# Patient Record
Sex: MALE | Race: WHITE
[De-identification: names, ages, dates, MRNs, and addresses within clinical notes are randomized per-mention and may not be internally consistent; named-entity substitution may affect disease eponyms.]

---

## 2017-04-14 ENCOUNTER — HOSPITAL ENCOUNTER (OUTPATIENT)
Dept: HOSPITAL 8 - CFH | Age: 57
Discharge: HOME | End: 2017-04-14
Attending: NURSE PRACTITIONER
Payer: MEDICARE

## 2017-04-14 DIAGNOSIS — S33.120A: Primary | ICD-10-CM
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
## 2017-04-14 DIAGNOSIS — S34.101A: ICD-10-CM

## 2017-04-14 DIAGNOSIS — M46.86: ICD-10-CM
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
## 2017-04-14 DIAGNOSIS — M48.06: ICD-10-CM

## 2017-04-14 DIAGNOSIS — M47.816: ICD-10-CM
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
## 2017-04-14 DIAGNOSIS — M48.07: ICD-10-CM

## 2017-04-14 DIAGNOSIS — M48.56XA: ICD-10-CM

## 2017-04-14 PROCEDURE — 72110 X-RAY EXAM L-2 SPINE 4/>VWS: CPT

## 2017-04-14 PROCEDURE — 72148 MRI LUMBAR SPINE W/O DYE: CPT

## 2017-07-11 ENCOUNTER — HOSPITAL ENCOUNTER (OUTPATIENT)
Dept: HOSPITAL 8 - CFH | Age: 57
Discharge: HOME | End: 2017-07-11
Attending: NURSE PRACTITIONER
Payer: MEDICARE

## 2017-07-11 DIAGNOSIS — M50.21: ICD-10-CM

## 2017-07-11 DIAGNOSIS — M47.812: ICD-10-CM

## 2017-07-11 DIAGNOSIS — M50.323: Primary | ICD-10-CM

## 2017-07-11 PROCEDURE — 72050 X-RAY EXAM NECK SPINE 4/5VWS: CPT

## 2017-07-11 PROCEDURE — 72141 MRI NECK SPINE W/O DYE: CPT

## 2018-11-15 ENCOUNTER — HOSPITAL ENCOUNTER (OUTPATIENT)
Dept: HOSPITAL 8 - CVU | Age: 58
Discharge: HOME | End: 2018-11-15
Attending: INTERNAL MEDICINE
Payer: MEDICARE

## 2018-11-15 DIAGNOSIS — G45.9: ICD-10-CM

## 2018-11-15 DIAGNOSIS — Z72.0: ICD-10-CM

## 2018-11-15 DIAGNOSIS — E78.5: ICD-10-CM

## 2018-11-15 DIAGNOSIS — J44.9: ICD-10-CM

## 2018-11-15 DIAGNOSIS — Z01.810: Primary | ICD-10-CM

## 2018-11-15 DIAGNOSIS — I10: ICD-10-CM

## 2018-11-15 PROCEDURE — 93306 TTE W/DOPPLER COMPLETE: CPT

## 2018-11-15 PROCEDURE — 93922 UPR/L XTREMITY ART 2 LEVELS: CPT

## 2018-11-15 PROCEDURE — 93880 EXTRACRANIAL BILAT STUDY: CPT

## 2019-03-04 ENCOUNTER — HOSPITAL ENCOUNTER (OUTPATIENT)
Dept: HOSPITAL 8 - CFH | Age: 59
Discharge: HOME | End: 2019-03-04
Attending: INTERNAL MEDICINE
Payer: MEDICARE

## 2019-03-04 DIAGNOSIS — E78.5: Primary | ICD-10-CM

## 2019-03-04 LAB
ALBUMIN SERPL-MCNC: 4.4 G/DL (ref 3.4–5)
ALP SERPL-CCNC: 90 U/L (ref 45–117)
ALT SERPL-CCNC: 26 U/L (ref 12–78)
ANION GAP SERPL CALC-SCNC: 3 MMOL/L (ref 5–15)
BILIRUB SERPL-MCNC: 0.9 MG/DL (ref 0.2–1)
CALCIUM SERPL-MCNC: 9 MG/DL (ref 8.5–10.1)
CHLORIDE SERPL-SCNC: 109 MMOL/L (ref 98–107)
CHOL/HDL RATIO: 3.9
CREAT SERPL-MCNC: 0.95 MG/DL (ref 0.7–1.3)
ERYTHROCYTE [DISTWIDTH] IN BLOOD BY AUTOMATED COUNT: 14.1 % (ref 9.4–14.8)
HDL CHOL %: 26 % (ref 26–37)
HDL CHOLESTEROL (DIRECT): 36 MG/DL (ref 40–60)
LDL CHOLESTEROL,CALCULATED: 79 MG/DL (ref 54–169)
LDLC/HDLC SERPL: 2.2 {RATIO} (ref 0.5–3)
MCH RBC QN AUTO: 32.1 PG (ref 27.5–34.5)
MCHC RBC AUTO-ENTMCNC: 33.5 G/DL (ref 33.2–36.2)
MCV RBC AUTO: 95.7 FL (ref 81–97)
PLATELET # BLD AUTO: 234 X10^3/UL (ref 130–400)
PMV BLD AUTO: 8.5 FL (ref 7.4–10.4)
PROT SERPL-MCNC: 7.5 G/DL (ref 6.4–8.2)
RBC # BLD AUTO: 4.59 X10^6/UL (ref 4.38–5.82)
TRIGL SERPL-MCNC: 127 MG/DL (ref 50–200)
VLDLC SERPL CALC-MCNC: 25 MG/DL (ref 0–25)

## 2019-03-04 PROCEDURE — 85027 COMPLETE CBC AUTOMATED: CPT

## 2019-03-04 PROCEDURE — 36415 COLL VENOUS BLD VENIPUNCTURE: CPT

## 2019-03-04 PROCEDURE — 80061 LIPID PANEL: CPT

## 2019-03-04 PROCEDURE — 80053 COMPREHEN METABOLIC PANEL: CPT

## 2019-03-13 ENCOUNTER — HOSPITAL ENCOUNTER (OUTPATIENT)
Dept: HOSPITAL 8 - CFH | Age: 59
Discharge: HOME | End: 2019-03-13
Attending: INTERNAL MEDICINE
Payer: MEDICARE

## 2019-03-13 DIAGNOSIS — I21.29: ICD-10-CM

## 2019-03-13 DIAGNOSIS — I21.19: Primary | ICD-10-CM

## 2019-03-13 DIAGNOSIS — I25.10: ICD-10-CM

## 2019-03-13 DIAGNOSIS — Z95.1: ICD-10-CM

## 2019-03-13 DIAGNOSIS — Z87.891: ICD-10-CM

## 2019-03-13 PROCEDURE — A9502 TC99M TETROFOSMIN: HCPCS

## 2019-03-13 PROCEDURE — 93017 CV STRESS TEST TRACING ONLY: CPT

## 2019-03-13 PROCEDURE — 78452 HT MUSCLE IMAGE SPECT MULT: CPT

## 2019-07-08 ENCOUNTER — HOSPITAL ENCOUNTER (OUTPATIENT)
Dept: HOSPITAL 8 - CFH | Age: 59
Discharge: HOME | End: 2019-07-08
Attending: INTERNAL MEDICINE
Payer: MEDICARE

## 2019-07-08 DIAGNOSIS — R73.01: Primary | ICD-10-CM

## 2019-07-08 LAB
EST. AVERAGE GLUCOSE BLD GHB EST-MCNC: 131 MG/DL (ref 0–126)
HBA1C MFR BLD: 6.2 % (ref 4.2–6.3)

## 2019-07-08 PROCEDURE — 36415 COLL VENOUS BLD VENIPUNCTURE: CPT

## 2019-07-08 PROCEDURE — 83036 HEMOGLOBIN GLYCOSYLATED A1C: CPT

## 2019-12-26 ENCOUNTER — HOSPITAL ENCOUNTER (OUTPATIENT)
Dept: HOSPITAL 8 - CFH | Age: 59
Discharge: HOME | End: 2019-12-26
Attending: INTERNAL MEDICINE
Payer: MEDICARE

## 2019-12-26 DIAGNOSIS — R73.01: ICD-10-CM

## 2019-12-26 DIAGNOSIS — E78.5: Primary | ICD-10-CM

## 2019-12-26 LAB
ALBUMIN SERPL-MCNC: 4.3 G/DL (ref 3.4–5)
ALP SERPL-CCNC: 80 U/L (ref 45–117)
ALT SERPL-CCNC: 32 U/L (ref 12–78)
ANION GAP SERPL CALC-SCNC: 6 MMOL/L (ref 5–15)
BILIRUB SERPL-MCNC: 1.3 MG/DL (ref 0.2–1)
CALCIUM SERPL-MCNC: 9.1 MG/DL (ref 8.5–10.1)
CHLORIDE SERPL-SCNC: 108 MMOL/L (ref 98–107)
CHOL/HDL RATIO: 3.5
CREAT SERPL-MCNC: 1.05 MG/DL (ref 0.7–1.3)
ERYTHROCYTE [DISTWIDTH] IN BLOOD BY AUTOMATED COUNT: 14.4 % (ref 9.4–14.8)
EST. AVERAGE GLUCOSE BLD GHB EST-MCNC: 128 MG/DL (ref 0–126)
HDL CHOL %: 28 % (ref 26–37)
HDL CHOLESTEROL (DIRECT): 46 MG/DL (ref 40–60)
LDL CHOLESTEROL,CALCULATED: 86 MG/DL (ref 54–169)
LDLC/HDLC SERPL: 1.9 {RATIO} (ref 0.5–3)
MCH RBC QN AUTO: 32.9 PG (ref 27.5–34.5)
MCHC RBC AUTO-ENTMCNC: 33.2 G/DL (ref 33.2–36.2)
MCV RBC AUTO: 99.2 FL (ref 81–97)
PLATELET # BLD AUTO: 235 X10^3/UL (ref 130–400)
PMV BLD AUTO: 7.7 FL (ref 7.4–10.4)
PROT SERPL-MCNC: 7.6 G/DL (ref 6.4–8.2)
RBC # BLD AUTO: 4.98 X10^6/UL (ref 4.38–5.82)
TRIGL SERPL-MCNC: 155 MG/DL (ref 50–200)
VLDLC SERPL CALC-MCNC: 31 MG/DL (ref 0–25)

## 2019-12-26 PROCEDURE — 80053 COMPREHEN METABOLIC PANEL: CPT

## 2019-12-26 PROCEDURE — 85027 COMPLETE CBC AUTOMATED: CPT

## 2019-12-26 PROCEDURE — 36415 COLL VENOUS BLD VENIPUNCTURE: CPT

## 2019-12-26 PROCEDURE — 83036 HEMOGLOBIN GLYCOSYLATED A1C: CPT

## 2019-12-26 PROCEDURE — 80061 LIPID PANEL: CPT

## 2020-09-14 ENCOUNTER — HOSPITAL ENCOUNTER (OUTPATIENT)
Dept: HOSPITAL 8 - CFH | Age: 60
Discharge: HOME | End: 2020-09-14
Attending: INTERNAL MEDICINE
Payer: MEDICARE

## 2020-09-14 DIAGNOSIS — I10: ICD-10-CM

## 2020-09-14 DIAGNOSIS — I35.8: Primary | ICD-10-CM

## 2020-09-14 PROCEDURE — 93306 TTE W/DOPPLER COMPLETE: CPT

## 2021-06-25 ENCOUNTER — HOSPITAL ENCOUNTER (OUTPATIENT)
Dept: HOSPITAL 8 - CFH | Age: 61
Discharge: HOME | End: 2021-06-25
Attending: INTERNAL MEDICINE
Payer: MEDICARE

## 2021-06-25 DIAGNOSIS — R07.9: ICD-10-CM

## 2021-06-25 DIAGNOSIS — I25.119: Primary | ICD-10-CM

## 2021-06-25 PROCEDURE — 78452 HT MUSCLE IMAGE SPECT MULT: CPT

## 2021-06-25 PROCEDURE — A9502 TC99M TETROFOSMIN: HCPCS

## 2021-06-25 PROCEDURE — 93017 CV STRESS TEST TRACING ONLY: CPT

## 2021-06-27 ENCOUNTER — HOSPITAL ENCOUNTER (INPATIENT)
Dept: HOSPITAL 8 - ED | Age: 61
LOS: 2 days | Discharge: HOME | DRG: 246 | End: 2021-06-29
Attending: INTERNAL MEDICINE | Admitting: INTERNAL MEDICINE
Payer: MEDICARE

## 2021-06-27 VITALS — SYSTOLIC BLOOD PRESSURE: 127 MMHG | DIASTOLIC BLOOD PRESSURE: 83 MMHG

## 2021-06-27 VITALS — SYSTOLIC BLOOD PRESSURE: 131 MMHG | DIASTOLIC BLOOD PRESSURE: 92 MMHG

## 2021-06-27 VITALS — HEIGHT: 74 IN | BODY MASS INDEX: 26.48 KG/M2 | WEIGHT: 206.35 LBS

## 2021-06-27 VITALS — DIASTOLIC BLOOD PRESSURE: 89 MMHG | SYSTOLIC BLOOD PRESSURE: 135 MMHG

## 2021-06-27 VITALS — DIASTOLIC BLOOD PRESSURE: 88 MMHG | SYSTOLIC BLOOD PRESSURE: 135 MMHG

## 2021-06-27 VITALS — SYSTOLIC BLOOD PRESSURE: 132 MMHG | DIASTOLIC BLOOD PRESSURE: 84 MMHG

## 2021-06-27 VITALS — SYSTOLIC BLOOD PRESSURE: 124 MMHG | DIASTOLIC BLOOD PRESSURE: 79 MMHG

## 2021-06-27 VITALS — DIASTOLIC BLOOD PRESSURE: 83 MMHG | SYSTOLIC BLOOD PRESSURE: 116 MMHG

## 2021-06-27 VITALS — DIASTOLIC BLOOD PRESSURE: 93 MMHG | SYSTOLIC BLOOD PRESSURE: 146 MMHG

## 2021-06-27 VITALS — SYSTOLIC BLOOD PRESSURE: 129 MMHG | DIASTOLIC BLOOD PRESSURE: 73 MMHG

## 2021-06-27 VITALS — DIASTOLIC BLOOD PRESSURE: 73 MMHG | SYSTOLIC BLOOD PRESSURE: 120 MMHG

## 2021-06-27 VITALS — SYSTOLIC BLOOD PRESSURE: 137 MMHG | DIASTOLIC BLOOD PRESSURE: 91 MMHG

## 2021-06-27 VITALS — DIASTOLIC BLOOD PRESSURE: 80 MMHG | SYSTOLIC BLOOD PRESSURE: 119 MMHG

## 2021-06-27 VITALS — SYSTOLIC BLOOD PRESSURE: 131 MMHG | DIASTOLIC BLOOD PRESSURE: 84 MMHG

## 2021-06-27 VITALS — DIASTOLIC BLOOD PRESSURE: 75 MMHG | SYSTOLIC BLOOD PRESSURE: 133 MMHG

## 2021-06-27 VITALS — SYSTOLIC BLOOD PRESSURE: 127 MMHG | DIASTOLIC BLOOD PRESSURE: 80 MMHG

## 2021-06-27 VITALS — DIASTOLIC BLOOD PRESSURE: 75 MMHG | SYSTOLIC BLOOD PRESSURE: 124 MMHG

## 2021-06-27 VITALS — DIASTOLIC BLOOD PRESSURE: 76 MMHG | SYSTOLIC BLOOD PRESSURE: 133 MMHG

## 2021-06-27 DIAGNOSIS — Z82.49: ICD-10-CM

## 2021-06-27 DIAGNOSIS — Z82.5: ICD-10-CM

## 2021-06-27 DIAGNOSIS — I25.10: ICD-10-CM

## 2021-06-27 DIAGNOSIS — E11.9: ICD-10-CM

## 2021-06-27 DIAGNOSIS — Z95.1: ICD-10-CM

## 2021-06-27 DIAGNOSIS — Z79.899: ICD-10-CM

## 2021-06-27 DIAGNOSIS — I21.19: Primary | ICD-10-CM

## 2021-06-27 DIAGNOSIS — J44.9: ICD-10-CM

## 2021-06-27 DIAGNOSIS — G47.33: ICD-10-CM

## 2021-06-27 DIAGNOSIS — E78.5: ICD-10-CM

## 2021-06-27 DIAGNOSIS — Z88.2: ICD-10-CM

## 2021-06-27 DIAGNOSIS — F17.210: ICD-10-CM

## 2021-06-27 DIAGNOSIS — E87.8: ICD-10-CM

## 2021-06-27 DIAGNOSIS — I25.2: ICD-10-CM

## 2021-06-27 DIAGNOSIS — Z71.6: ICD-10-CM

## 2021-06-27 DIAGNOSIS — I50.33: ICD-10-CM

## 2021-06-27 DIAGNOSIS — Z20.822: ICD-10-CM

## 2021-06-27 DIAGNOSIS — I11.0: ICD-10-CM

## 2021-06-27 LAB
ALBUMIN SERPL-MCNC: 3.6 G/DL (ref 3.4–5)
ANION GAP SERPL CALC-SCNC: 4 MMOL/L (ref 5–15)
ANION GAP SERPL CALC-SCNC: 5 MMOL/L (ref 5–15)
APTT BLD: 25 SECONDS (ref 25–31)
BASOPHILS # BLD AUTO: 0 X10^3/UL (ref 0–0.1)
BASOPHILS # BLD AUTO: 0.1 X10^3/UL (ref 0–0.1)
BASOPHILS NFR BLD AUTO: 1 % (ref 0–1)
BASOPHILS NFR BLD AUTO: 1 % (ref 0–1)
CALCIUM SERPL-MCNC: 8 MG/DL (ref 8.5–10.1)
CALCIUM SERPL-MCNC: 8.3 MG/DL (ref 8.5–10.1)
CHLORIDE SERPL-SCNC: 110 MMOL/L (ref 98–107)
CHLORIDE SERPL-SCNC: 112 MMOL/L (ref 98–107)
CREAT SERPL-MCNC: 0.74 MG/DL (ref 0.7–1.3)
CREAT SERPL-MCNC: 0.98 MG/DL (ref 0.7–1.3)
EOSINOPHIL # BLD AUTO: 0.1 X10^3/UL (ref 0–0.4)
EOSINOPHIL # BLD AUTO: 0.2 X10^3/UL (ref 0–0.4)
EOSINOPHIL NFR BLD AUTO: 3 % (ref 1–7)
EOSINOPHIL NFR BLD AUTO: 4 % (ref 1–7)
ERYTHROCYTE [DISTWIDTH] IN BLOOD BY AUTOMATED COUNT: 13.9 % (ref 9.4–14.8)
ERYTHROCYTE [DISTWIDTH] IN BLOOD BY AUTOMATED COUNT: 13.9 % (ref 9.4–14.8)
INR PPP: 1.01 (ref 0.93–1.1)
LYMPHOCYTES # BLD AUTO: 1.2 X10^3/UL (ref 1–3.4)
LYMPHOCYTES # BLD AUTO: 2.1 X10^3/UL (ref 1–3.4)
LYMPHOCYTES NFR BLD AUTO: 25 % (ref 22–44)
LYMPHOCYTES NFR BLD AUTO: 34 % (ref 22–44)
MCH RBC QN AUTO: 33 PG (ref 27.5–34.5)
MCH RBC QN AUTO: 33.5 PG (ref 27.5–34.5)
MCHC RBC AUTO-ENTMCNC: 34 G/DL (ref 33.2–36.2)
MCHC RBC AUTO-ENTMCNC: 34.2 G/DL (ref 33.2–36.2)
MONOCYTES # BLD AUTO: 0.4 X10^3/UL (ref 0.2–0.8)
MONOCYTES # BLD AUTO: 0.7 X10^3/UL (ref 0.2–0.8)
MONOCYTES NFR BLD AUTO: 10 % (ref 2–9)
MONOCYTES NFR BLD AUTO: 9 % (ref 2–9)
NEUTROPHILS # BLD AUTO: 3.1 X10^3/UL (ref 1.8–6.8)
NEUTROPHILS # BLD AUTO: 3.2 X10^3/UL (ref 1.8–6.8)
NEUTROPHILS NFR BLD AUTO: 52 % (ref 42–75)
NEUTROPHILS NFR BLD AUTO: 63 % (ref 42–75)
PLATELET # BLD AUTO: 154 X10^3/UL (ref 130–400)
PLATELET # BLD AUTO: 176 X10^3/UL (ref 130–400)
PMV BLD AUTO: 7.8 FL (ref 7.4–10.4)
PMV BLD AUTO: 7.8 FL (ref 7.4–10.4)
PROTHROMBIN TIME: 10.8 SECONDS (ref 9.6–11.5)
RBC # BLD AUTO: 4.39 X10^6/UL (ref 4.38–5.82)
RBC # BLD AUTO: 4.61 X10^6/UL (ref 4.38–5.82)
TROPONIN I SERPL-MCNC: 0.46 NG/ML (ref 0–0.04)
TROPONIN I SERPL-MCNC: 50.2 NG/ML (ref 0–0.04)

## 2021-06-27 PROCEDURE — 36415 COLL VENOUS BLD VENIPUNCTURE: CPT

## 2021-06-27 PROCEDURE — 96375 TX/PRO/DX INJ NEW DRUG ADDON: CPT

## 2021-06-27 PROCEDURE — 85610 PROTHROMBIN TIME: CPT

## 2021-06-27 PROCEDURE — B2111ZZ FLUOROSCOPY OF MULTIPLE CORONARY ARTERIES USING LOW OSMOLAR CONTRAST: ICD-10-PCS | Performed by: INTERNAL MEDICINE

## 2021-06-27 PROCEDURE — C1887 CATHETER, GUIDING: HCPCS

## 2021-06-27 PROCEDURE — 87635 SARS-COV-2 COVID-19 AMP PRB: CPT

## 2021-06-27 PROCEDURE — 71045 X-RAY EXAM CHEST 1 VIEW: CPT

## 2021-06-27 PROCEDURE — 82040 ASSAY OF SERUM ALBUMIN: CPT

## 2021-06-27 PROCEDURE — 027034Z DILATION OF CORONARY ARTERY, ONE ARTERY WITH DRUG-ELUTING INTRALUMINAL DEVICE, PERCUTANEOUS APPROACH: ICD-10-PCS | Performed by: INTERNAL MEDICINE

## 2021-06-27 PROCEDURE — C1725 CATH, TRANSLUMIN NON-LASER: HCPCS

## 2021-06-27 PROCEDURE — 99157 MOD SED OTHER PHYS/QHP EA: CPT

## 2021-06-27 PROCEDURE — 84100 ASSAY OF PHOSPHORUS: CPT

## 2021-06-27 PROCEDURE — 4A023N7 MEASUREMENT OF CARDIAC SAMPLING AND PRESSURE, LEFT HEART, PERCUTANEOUS APPROACH: ICD-10-PCS | Performed by: INTERNAL MEDICINE

## 2021-06-27 PROCEDURE — 84484 ASSAY OF TROPONIN QUANT: CPT

## 2021-06-27 PROCEDURE — 87081 CULTURE SCREEN ONLY: CPT

## 2021-06-27 PROCEDURE — C1760 CLOSURE DEV, VASC: HCPCS

## 2021-06-27 PROCEDURE — 93306 TTE W/DOPPLER COMPLETE: CPT

## 2021-06-27 PROCEDURE — C1757 CATH, THROMBECTOMY/EMBOLECT: HCPCS

## 2021-06-27 PROCEDURE — 80047 BASIC METABLC PNL IONIZED CA: CPT

## 2021-06-27 PROCEDURE — C1894 INTRO/SHEATH, NON-LASER: HCPCS

## 2021-06-27 PROCEDURE — 83735 ASSAY OF MAGNESIUM: CPT

## 2021-06-27 PROCEDURE — 99156 MOD SED OTH PHYS/QHP 5/>YRS: CPT

## 2021-06-27 PROCEDURE — A9502 TC99M TETROFOSMIN: HCPCS

## 2021-06-27 PROCEDURE — 93356 MYOCRD STRAIN IMG SPCKL TRCK: CPT

## 2021-06-27 PROCEDURE — 93017 CV STRESS TEST TRACING ONLY: CPT

## 2021-06-27 PROCEDURE — 93455 CORONARY ART/GRFT ANGIO S&I: CPT

## 2021-06-27 PROCEDURE — 80048 BASIC METABOLIC PNL TOTAL CA: CPT

## 2021-06-27 PROCEDURE — B2131ZZ FLUOROSCOPY OF MULTIPLE CORONARY ARTERY BYPASS GRAFTS USING LOW OSMOLAR CONTRAST: ICD-10-PCS | Performed by: INTERNAL MEDICINE

## 2021-06-27 PROCEDURE — 78452 HT MUSCLE IMAGE SPECT MULT: CPT

## 2021-06-27 PROCEDURE — 96374 THER/PROPH/DIAG INJ IV PUSH: CPT

## 2021-06-27 PROCEDURE — 92973 PRQ TRLUML C MCHN ASP THRMBC: CPT

## 2021-06-27 PROCEDURE — 93005 ELECTROCARDIOGRAM TRACING: CPT

## 2021-06-27 PROCEDURE — C1874 STENT, COATED/COV W/DEL SYS: HCPCS

## 2021-06-27 PROCEDURE — 85730 THROMBOPLASTIN TIME PARTIAL: CPT

## 2021-06-27 PROCEDURE — 80061 LIPID PANEL: CPT

## 2021-06-27 PROCEDURE — 85025 COMPLETE CBC W/AUTO DIFF WBC: CPT

## 2021-06-27 PROCEDURE — C1769 GUIDE WIRE: HCPCS

## 2021-06-27 RX ADMIN — LISINOPRIL SCH MG: 10 TABLET ORAL at 08:34

## 2021-06-27 RX ADMIN — OMEPRAZOLE SCH MG: 20 CAPSULE, DELAYED RELEASE ORAL at 05:29

## 2021-06-27 RX ADMIN — TICAGRELOR SCH MG: 90 TABLET ORAL at 20:46

## 2021-06-27 RX ADMIN — FENOFIBRATE SCH MG: 145 TABLET, FILM COATED ORAL at 08:35

## 2021-06-27 RX ADMIN — METOPROLOL TARTRATE SCH MG: 50 TABLET, FILM COATED ORAL at 05:29

## 2021-06-27 RX ADMIN — ATORVASTATIN CALCIUM SCH MG: 80 TABLET, FILM COATED ORAL at 20:46

## 2021-06-27 RX ADMIN — ASPIRIN SCH MG: 81 TABLET, COATED ORAL at 05:29

## 2021-06-27 RX ADMIN — TICAGRELOR SCH MG: 90 TABLET ORAL at 08:35

## 2021-06-27 RX ADMIN — METOPROLOL TARTRATE SCH MG: 25 TABLET, FILM COATED ORAL at 17:29

## 2021-06-27 RX ADMIN — AMLODIPINE BESYLATE SCH MG: 10 TABLET ORAL at 08:34

## 2021-06-27 NOTE — NUR
PT MOVED TO CATH LAB AT THIS TIME, ON CR MONITOR, AND OXYGEN.  PT STATES SOME 
RELIEF OF PAIN TO HIS CHEST WITH MEDICATIONS GIVEN. PT CARE TRANSFERRED TO CATH 
LAB.

## 2021-06-27 NOTE — NUR
PT ARRIVED BY EMS TO TR4, INITIAL COMPLAINT OF CHEST PAIN STARTED AT HOME 
APPROX 1 HOUR AGO, AND SAID HE WAS GOING TO BED, AND IT STARTED AT A 10/10. PT 
CALLED EMS, AND EMS ON EKG DONE, CALLED STEMI PRE ALERT.  



PT ARRIVED AND PLACED ON CR MONITOR, BP CUFF LEFT ARM, AND O2 SAT PROBE, RIGHT 
THUMB, RIGHT GROIN SHAVED, CLEAR DEFIB PADS PLACED ON PT. PIV TO RIGHT AC BY 
EMS IS AN 18G AND FLUSHED EASILY.   2ND PIV TO LEFT HAND STARTED, 18G X1 
STARTED AND NS TKO AT THIS TIME.

## 2021-06-28 VITALS — DIASTOLIC BLOOD PRESSURE: 73 MMHG | SYSTOLIC BLOOD PRESSURE: 116 MMHG

## 2021-06-28 VITALS — SYSTOLIC BLOOD PRESSURE: 110 MMHG | DIASTOLIC BLOOD PRESSURE: 69 MMHG

## 2021-06-28 VITALS — DIASTOLIC BLOOD PRESSURE: 73 MMHG | SYSTOLIC BLOOD PRESSURE: 113 MMHG

## 2021-06-28 VITALS — SYSTOLIC BLOOD PRESSURE: 112 MMHG | DIASTOLIC BLOOD PRESSURE: 73 MMHG

## 2021-06-28 LAB
ANION GAP SERPL CALC-SCNC: 3 MMOL/L (ref 5–15)
BASOPHILS # BLD AUTO: 0 X10^3/UL (ref 0–0.1)
BASOPHILS NFR BLD AUTO: 1 % (ref 0–1)
CALCIUM SERPL-MCNC: 8.7 MG/DL (ref 8.5–10.1)
CHLORIDE SERPL-SCNC: 109 MMOL/L (ref 98–107)
CHOL/HDL RATIO: 4.1
CREAT SERPL-MCNC: 0.92 MG/DL (ref 0.7–1.3)
EOSINOPHIL # BLD AUTO: 0.2 X10^3/UL (ref 0–0.4)
EOSINOPHIL NFR BLD AUTO: 3 % (ref 1–7)
ERYTHROCYTE [DISTWIDTH] IN BLOOD BY AUTOMATED COUNT: 13.8 % (ref 9.4–14.8)
HDL CHOL %: 24 % (ref 26–37)
HDL CHOLESTEROL (DIRECT): 28 MG/DL (ref 40–60)
LDL CHOLESTEROL,CALCULATED: 64 MG/DL (ref 54–169)
LDLC/HDLC SERPL: 2.3 {RATIO} (ref 0.5–3)
LYMPHOCYTES # BLD AUTO: 1.1 X10^3/UL (ref 1–3.4)
LYMPHOCYTES NFR BLD AUTO: 20 % (ref 22–44)
MCH RBC QN AUTO: 32.5 PG (ref 27.5–34.5)
MCHC RBC AUTO-ENTMCNC: 33.3 G/DL (ref 33.2–36.2)
MONOCYTES # BLD AUTO: 0.6 X10^3/UL (ref 0.2–0.8)
MONOCYTES NFR BLD AUTO: 11 % (ref 2–9)
NEUTROPHILS # BLD AUTO: 3.7 X10^3/UL (ref 1.8–6.8)
NEUTROPHILS NFR BLD AUTO: 66 % (ref 42–75)
PLATELET # BLD AUTO: 161 X10^3/UL (ref 130–400)
PMV BLD AUTO: 8 FL (ref 7.4–10.4)
RBC # BLD AUTO: 4.26 X10^6/UL (ref 4.38–5.82)
TRIGL SERPL-MCNC: 119 MG/DL (ref 50–200)
VLDLC SERPL CALC-MCNC: 24 MG/DL (ref 0–25)

## 2021-06-28 RX ADMIN — METOPROLOL TARTRATE SCH MG: 25 TABLET, FILM COATED ORAL at 17:32

## 2021-06-28 RX ADMIN — FENOFIBRATE SCH MG: 145 TABLET, FILM COATED ORAL at 08:46

## 2021-06-28 RX ADMIN — TICAGRELOR SCH MG: 90 TABLET ORAL at 20:15

## 2021-06-28 RX ADMIN — ATORVASTATIN CALCIUM SCH MG: 80 TABLET, FILM COATED ORAL at 20:15

## 2021-06-28 RX ADMIN — LISINOPRIL SCH MG: 10 TABLET ORAL at 08:45

## 2021-06-28 RX ADMIN — METOPROLOL TARTRATE SCH MG: 50 TABLET, FILM COATED ORAL at 05:27

## 2021-06-28 RX ADMIN — AMLODIPINE BESYLATE SCH MG: 10 TABLET ORAL at 08:45

## 2021-06-28 RX ADMIN — TICAGRELOR SCH MG: 90 TABLET ORAL at 08:45

## 2021-06-28 RX ADMIN — ASPIRIN SCH MG: 81 TABLET, COATED ORAL at 05:27

## 2021-06-28 RX ADMIN — OMEPRAZOLE SCH MG: 20 CAPSULE, DELAYED RELEASE ORAL at 05:27

## 2021-06-29 VITALS — SYSTOLIC BLOOD PRESSURE: 129 MMHG | DIASTOLIC BLOOD PRESSURE: 83 MMHG

## 2021-06-29 VITALS — DIASTOLIC BLOOD PRESSURE: 68 MMHG | SYSTOLIC BLOOD PRESSURE: 102 MMHG

## 2021-06-29 RX ADMIN — AMLODIPINE BESYLATE SCH MG: 10 TABLET ORAL at 08:48

## 2021-06-29 RX ADMIN — OMEPRAZOLE SCH MG: 20 CAPSULE, DELAYED RELEASE ORAL at 06:02

## 2021-06-29 RX ADMIN — METOPROLOL TARTRATE SCH MG: 50 TABLET, FILM COATED ORAL at 06:00

## 2021-06-29 RX ADMIN — LISINOPRIL SCH MG: 10 TABLET ORAL at 08:48

## 2021-06-29 RX ADMIN — TICAGRELOR SCH MG: 90 TABLET ORAL at 08:48

## 2021-06-29 RX ADMIN — ASPIRIN SCH MG: 81 TABLET, COATED ORAL at 06:02

## 2021-06-29 RX ADMIN — FENOFIBRATE SCH MG: 145 TABLET, FILM COATED ORAL at 08:48

## 2021-07-21 ENCOUNTER — HOSPITAL ENCOUNTER (OUTPATIENT)
Dept: HOSPITAL 8 - CVU | Age: 61
Discharge: HOME | End: 2021-07-21
Attending: INTERNAL MEDICINE
Payer: MEDICARE

## 2021-07-21 DIAGNOSIS — I65.23: Primary | ICD-10-CM

## 2021-07-21 PROCEDURE — 93880 EXTRACRANIAL BILAT STUDY: CPT
